# Patient Record
Sex: MALE | Race: BLACK OR AFRICAN AMERICAN | NOT HISPANIC OR LATINO | Employment: STUDENT | URBAN - METROPOLITAN AREA
[De-identification: names, ages, dates, MRNs, and addresses within clinical notes are randomized per-mention and may not be internally consistent; named-entity substitution may affect disease eponyms.]

---

## 2018-04-06 ENCOUNTER — HOSPITAL ENCOUNTER (EMERGENCY)
Facility: OTHER | Age: 33
Discharge: HOME | End: 2018-04-06
Attending: EMERGENCY MEDICINE
Payer: COMMERCIAL

## 2018-04-06 VITALS
DIASTOLIC BLOOD PRESSURE: 79 MMHG | SYSTOLIC BLOOD PRESSURE: 129 MMHG | HEIGHT: 65 IN | BODY MASS INDEX: 27.49 KG/M2 | WEIGHT: 165 LBS | RESPIRATION RATE: 16 BRPM | OXYGEN SATURATION: 97 % | HEART RATE: 81 BPM | TEMPERATURE: 98 F

## 2018-04-06 DIAGNOSIS — R51.9 NONINTRACTABLE HEADACHE, UNSPECIFIED CHRONICITY PATTERN, UNSPECIFIED HEADACHE TYPE: Primary | ICD-10-CM

## 2018-04-06 PROCEDURE — 96372 THER/PROPH/DIAG INJ SC/IM: CPT

## 2018-04-06 PROCEDURE — 63600175 PHARM REV CODE 636 W HCPCS: Performed by: EMERGENCY MEDICINE

## 2018-04-06 PROCEDURE — 99283 EMERGENCY DEPT VISIT LOW MDM: CPT | Mod: 25

## 2018-04-06 RX ORDER — METOCLOPRAMIDE HYDROCHLORIDE 5 MG/ML
10 INJECTION INTRAMUSCULAR; INTRAVENOUS
Status: COMPLETED | OUTPATIENT
Start: 2018-04-06 | End: 2018-04-06

## 2018-04-06 RX ORDER — KETOROLAC TROMETHAMINE 30 MG/ML
30 INJECTION, SOLUTION INTRAMUSCULAR; INTRAVENOUS
Status: COMPLETED | OUTPATIENT
Start: 2018-04-06 | End: 2018-04-06

## 2018-04-06 RX ORDER — IBUPROFEN 600 MG/1
600 TABLET ORAL EVERY 6 HOURS PRN
Qty: 20 TABLET | Refills: 0 | OUTPATIENT
Start: 2018-04-06 | End: 2018-11-05

## 2018-04-06 RX ORDER — METOCLOPRAMIDE 10 MG/1
10 TABLET ORAL EVERY 6 HOURS
Qty: 15 TABLET | Refills: 0 | Status: SHIPPED | OUTPATIENT
Start: 2018-04-06 | End: 2019-03-21

## 2018-04-06 RX ADMIN — KETOROLAC TROMETHAMINE 30 MG: 30 INJECTION, SOLUTION INTRAMUSCULAR at 09:04

## 2018-04-06 RX ADMIN — METOCLOPRAMIDE 10 MG: 5 INJECTION, SOLUTION INTRAMUSCULAR; INTRAVENOUS at 09:04

## 2018-04-07 NOTE — ED PROVIDER NOTES
Encounter Date: 4/6/2018       History     Chief Complaint   Patient presents with    Headache     x 3 days, also c/o sinus problems, no relief with OTC meds     A 33-year-old male with a gradual onset of a 10 over 10 headache that is stabbing on the right side of his head.  Patient states it feels like his typical migraines.  Feels his ALLERGIES and sinus have brought this headache on.  Tried taking over-the-counter medication at home with no improvement.  Denies any associated fevers or chills.          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 toes     Family History   Problem Relation Age of Onset    Stroke Maternal Grandmother     Heart disease Maternal Grandmother     Heart disease Mother     Stroke Mother     Stroke Maternal Grandfather     Heart disease Maternal Grandfather     Stroke Paternal Grandmother     Heart disease Paternal Grandmother     Stroke Paternal Grandfather     Heart disease Paternal Grandfather      Social History   Substance Use Topics    Smoking status: Never Smoker    Smokeless tobacco: Not on file    Alcohol use No     Review of Systems   Constitutional: Negative for activity change, appetite change, chills and fever.   HENT: Positive for congestion. Negative for sinus pressure.    Eyes: Negative for discharge.   Respiratory: Negative for cough, choking, chest tightness and shortness of breath.    Cardiovascular: Negative for chest pain.   Gastrointestinal: Negative for abdominal pain, diarrhea and vomiting.   Genitourinary: Negative for difficulty urinating and dysuria.   Musculoskeletal: Negative for arthralgias.   Skin: Negative for color change.   Neurological: Positive for headaches. Negative for dizziness.   Hematological: Does not bruise/bleed  easily.       Physical Exam     Initial Vitals [04/06/18 2054]   BP Pulse Resp Temp SpO2   129/79 81 16 98.1 °F (36.7 °C) 97 %      MAP       95.67         Physical Exam    Nursing note and vitals reviewed.  Constitutional: He appears well-developed and well-nourished.  Non-toxic appearance. He does not have a sickly appearance. No distress.   HENT:   Head: Normocephalic and atraumatic.   Mouth/Throat: Oropharynx is clear and moist.   Eyes: Conjunctivae, EOM and lids are normal. Pupils are equal, round, and reactive to light. Right eye exhibits no nystagmus. Left eye exhibits no nystagmus.   Neck: Trachea normal, normal range of motion and phonation normal. Neck supple.   Cardiovascular: Normal rate, regular rhythm and normal heart sounds. Exam reveals no gallop and no friction rub.    No murmur heard.  Pulmonary/Chest: Breath sounds normal. No respiratory distress. He has no wheezes. He has no rhonchi. He has no rales.   Abdominal: Soft. Normal appearance and bowel sounds are normal. There is no tenderness. There is no rigidity, no rebound, no guarding, no tenderness at McBurney's point and negative Stokes's sign.   Musculoskeletal: Normal range of motion.   Neurological: He is alert and oriented to person, place, and time. He has normal strength and normal reflexes. He displays normal reflexes. No cranial nerve deficit or sensory deficit. He displays a negative Romberg sign.   Skin: Skin is warm, dry and intact. Capillary refill takes less than 2 seconds. No rash noted. No pallor.         ED Course   Procedures  Labs Reviewed - No data to display          Medical Decision Making:   ED Management:  I believe the patient has an unspecified headache based upon the history and physical exam.  Likely musculoskeletal.  The patient has no focal weakness, numbness, meningismus, other focal neurologic deficit, history of trauma, fevers, elevated blood pressure to suggest meningitis, subarachnoid hemorrhage, TIA, stroke,  mass, or hypertensive urgency. I do not feel a CT of the brain or blood work are necessary at this time. I will treat the patient with over-the-counter medications and have them follow up with their regular doctor.                         Clinical Impression:     1. Nonintractable headache, unspecified chronicity pattern, unspecified headache type                                Maik Edgar, DO  04/06/18 [id number]

## 2018-04-07 NOTE — ED TRIAGE NOTES
Pt presents to ED with c/o generalized headache x 3 days, reports no relief with OTC medications. Pt reports Hx of migraines, denies any other associated symptoms. Pt AAO x4.

## 2018-11-05 ENCOUNTER — HOSPITAL ENCOUNTER (EMERGENCY)
Facility: HOSPITAL | Age: 33
Discharge: HOME | End: 2018-11-05
Attending: EMERGENCY MEDICINE
Payer: COMMERCIAL

## 2018-11-05 VITALS
BODY MASS INDEX: 27.49 KG/M2 | TEMPERATURE: 99 F | HEART RATE: 81 BPM | WEIGHT: 165 LBS | OXYGEN SATURATION: 98 % | SYSTOLIC BLOOD PRESSURE: 122 MMHG | RESPIRATION RATE: 20 BRPM | HEIGHT: 65 IN | DIASTOLIC BLOOD PRESSURE: 76 MMHG

## 2018-11-05 DIAGNOSIS — R07.9 CHEST PAIN WITH LOW RISK OF ACUTE CORONARY SYNDROME: Primary | ICD-10-CM

## 2018-11-05 DIAGNOSIS — R07.89 CHEST DISCOMFORT: ICD-10-CM

## 2018-11-05 LAB — TROPONIN I SERPL DL<=0.01 NG/ML-MCNC: <0.006 NG/ML

## 2018-11-05 PROCEDURE — 84484 ASSAY OF TROPONIN QUANT: CPT

## 2018-11-05 PROCEDURE — 93010 ELECTROCARDIOGRAM REPORT: CPT | Mod: ,,, | Performed by: INTERNAL MEDICINE

## 2018-11-05 PROCEDURE — 99284 EMERGENCY DEPT VISIT MOD MDM: CPT | Mod: 25

## 2018-11-05 PROCEDURE — 93005 ELECTROCARDIOGRAM TRACING: CPT

## 2018-11-05 RX ORDER — HYDROCODONE BITARTRATE AND ACETAMINOPHEN 5; 325 MG/1; MG/1
1 TABLET ORAL
Status: DISCONTINUED | OUTPATIENT
Start: 2018-11-05 | End: 2018-11-05

## 2018-11-05 RX ORDER — IBUPROFEN 600 MG/1
600 TABLET ORAL EVERY 6 HOURS PRN
Qty: 20 TABLET | Refills: 0 | Status: SHIPPED | OUTPATIENT
Start: 2018-11-05 | End: 2019-03-21

## 2018-11-05 NOTE — DISCHARGE INSTRUCTIONS
Please return immediately if You get worse or if new problems develop.  Please follow-up with your primary care doctor this week.  Rest.  Ibuprofen for pain.

## 2018-11-05 NOTE — ED PROVIDER NOTES
Encounter Date: 11/5/2018    SORT: 3 week hx midsternal cp, radiates to mid back. Family hx premature heart disease. Denies personal cardiac hx. No DM. No HTN or HLD diagnosis. Nonsmoker.        History     Chief Complaint   Patient presents with    Chest Pain     pt here for chest pain that is constant x3 weeks. located midsternal; radiates to back. denies n/v/d.      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     Family History   Problem Relation Age of Onset    Stroke Maternal Grandmother     Heart disease Maternal Grandmother     Heart disease Mother     Stroke Mother     Stroke Maternal Grandfather     Heart disease Maternal Grandfather     Stroke Paternal Grandmother     Heart disease Paternal Grandmother     Stroke Paternal Grandfather     Heart disease Paternal Grandfather      Social History     Tobacco Use    Smoking status: Never Smoker   Substance Use Topics    Alcohol use: No    Drug use: No     Review of Systems    Physical Exam     Initial Vitals [11/05/18 1425]   BP Pulse Resp Temp SpO2   127/80 96 18 98.4 °F (36.9 °C) 97 %      MAP       --         Physical Exam  The patient was examined specifically for the following:   General:No significant distress, Good color, Warm and dry. Head and neck:Scalp atraumatic, Neck supple. Neurological:Appropriate conversation, Gross motor deficits. Eyes:Conjugate gaze, Clear corneas. ENT: No epistaxis. Cardiac: Regular rate and rhythm, Grossly normal heart tones. Pulmonary: Wheezing, Rales. Gastrointestinal: Abdominal tenderness, Abdominal distention. Musculoskeletal: Extremity deformity, Apparent pain with range of motion of the joints. Skin: Rash.   The findings on examination were normal .  The lungs are clear.  The heart  tones are normal.  The abdomen is soft.  There is mild tenderness in the left upper sternal border with palpation.  ED Course   Procedures  Labs Reviewed - No data to display       Imaging Results    None                               Clinical Impression:   The encounter diagnosis was Chest discomfort.                             Chuy Ray MD  11/05/18 8993

## 2018-11-05 NOTE — ED PROVIDER NOTES
Encounter Date: 11/5/2018    SCRIBE #1 NOTE: I, Darcy Cain, am scribing for, and in the presence of,  Chuy Ray MD. I have scribed the following portions of the note - Other sections scribed: HPI, ROS.       History     Chief Complaint   Patient presents with    Chest Pain     pt here for chest pain that is constant x3 weeks. located midsternal; radiates to back. denies n/v/d.      CC: Chest Pain    HPI: This is a 33 y.o. M who has Scoliosis, Lumbago, Arthritis, and Hx of Migraine Headache who presents to the ED for emergent evaluation of acute and intermittent mid-sternal CP that radiates to the left side of chest and left shoulder x's 3 weeks. Episodes of chest pain last 2 days at a time. Pt's CP is exacerbated by taking deep breaths and with coughing. He reports a 1 week history of cough. Pt also reports a mild right temporal headache. Pt denies fever, chills, ear pain, sore throat, eye pain, cough, SOB, CP, leg swelling, abdominal pain, nausea, vomiting, diarrhea, dysuria, back pain, leg problems, rash, Hx of DVT, Hx of PE, Hx of cardiac disease, tobacco use, or alcohol use.      The history is provided by the patient. No  was used.     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   HENT: Negative for ear pain and sore throat.    Eyes: Negative for pain.   Respiratory: Positive for cough. Negative for shortness of breath.    Cardiovascular: Positive for chest pain. Negative for leg swelling.   Gastrointestinal: Negative for abdominal pain, diarrhea, nausea and vomiting.   Genitourinary: Negative for dysuria.   Musculoskeletal: Positive for arthralgias (left shoulder). Negative for back pain.        (-) arm or leg problems   Skin: Negative for rash.   Neurological: Positive for headaches.       Physical Exam     Initial Vitals [11/05/18 1425]   BP Pulse Resp Temp SpO2   127/80 96 18 98.4 °F (36.9 °C) 97 %      MAP       --         Physical Exam  The patient was examined specifically for the following:   General:No significant distress, Good color, Warm and dry. Head and neck:Scalp atraumatic, Neck supple. Neurological:Appropriate conversation, Gross motor deficits. Eyes:Conjugate gaze, Clear corneas. ENT: No epistaxis. Cardiac: Regular rate and rhythm, Grossly normal heart tones. Pulmonary: Wheezing, Rales. Gastrointestinal: Abdominal tenderness, Abdominal distention. Musculoskeletal: Extremity deformity, Apparent pain with range of motion of the joints. Skin: Rash.   The findings on examination were normal except for the following:  The patient has tenderness of the left upper sternal border.  Lungs are clear.  The heart tones are normal.  The abdomen is soft.  ED Course   Procedures  Labs Reviewed   TROPONIN I     EKG Readings: (Independently Interpreted)   This patient is in a normal sinus rhythm with a heart rate of 86.  The p.r. QRS and QT intervals are normal.  There is no definite evidence of acute myocardial infarction or malignant arrhythmia.  This patient has a nonspecific intraventricular conduction delay.       Imaging Results          X-Ray Chest PA And Lateral (Final result)  Result  time 11/05/18 14:52:26    Final result by Lázaro Roth MD (11/05/18 14:52:26)                 Impression:      No acute cardiopulmonary disease      Electronically signed by: Lázaro Roth MD  Date:    11/05/2018  Time:    14:52             Narrative:    EXAMINATION:  XR CHEST PA AND LATERAL    CLINICAL HISTORY:  Other chest pain    TECHNIQUE:  PA and lateral views of the chest were performed.    COMPARISON:  05/12/2015    FINDINGS:  The heart size and pulmonary vessels are normal.  Lungs are expanded and clear.  No lung consolidation or pleural fluid is identified.  Skeletal structures show no acute finding, noting a moderate levoscoliosis around the thoracolumbar junction.                              Medical decision making:  This patient presents to the emergency room with a 2 week history chest pain that is worse with deep breathing.  The troponin is negative chest x-ray is negative the EKG is essentially unremarkable except for a nonspecific intraventricular conduction delay.  The patient has a low heart score.  The patient is not tachycardic or short of breath. I doubt pulmonary embolus.  Chest x-ray is negative for pneumothorax pneumonia pleural effusion.  I will discharge this patient to be evaluated by Cardiology as an outpatient.  He is low risk for coronary artery disease.                Scribe Attestation:   Scribe #1: I performed the above scribed service and the documentation accurately describes the services I performed. I attest to the accuracy of the note.    Attending Attestation:           Physician Attestation for Scribe:  Physician Attestation Statement for Scribe #1: I, Chuy Ray MD, reviewed documentation, as scribed by Darcy Cain in my presence, and it is both accurate and complete.                    Clinical Impression:   The primary encounter diagnosis was Chest pain with low risk of acute coronary syndrome. A diagnosis of Chest discomfort was also pertinent to this  visit.                             Chuy Ray MD  11/07/18 0889

## 2018-11-05 NOTE — ED TRIAGE NOTES
Patient stated that he's having chest pain and migraines X 3 weeks. Pain 10/10 chest and head. H/A constant and pounding in nature. Chest pain is sharp and radiates to back. No medications taken today for pain.

## 2019-03-21 ENCOUNTER — HOSPITAL ENCOUNTER (EMERGENCY)
Facility: HOSPITAL | Age: 34
Discharge: HOME | End: 2019-03-21
Attending: EMERGENCY MEDICINE
Payer: COMMERCIAL

## 2019-03-21 VITALS
WEIGHT: 176 LBS | BODY MASS INDEX: 29.29 KG/M2 | TEMPERATURE: 99 F | HEART RATE: 101 BPM | DIASTOLIC BLOOD PRESSURE: 81 MMHG | RESPIRATION RATE: 18 BRPM | OXYGEN SATURATION: 95 % | SYSTOLIC BLOOD PRESSURE: 118 MMHG

## 2019-03-21 DIAGNOSIS — Z71.1 CONCERN ABOUT INFECTIOUS DISEASE WITHOUT DIAGNOSIS: Primary | ICD-10-CM

## 2019-03-21 LAB
BASOPHILS # BLD AUTO: 0.03 K/UL
BASOPHILS NFR BLD: 0.7 %
DIFFERENTIAL METHOD: ABNORMAL
EOSINOPHIL # BLD AUTO: 0.4 K/UL
EOSINOPHIL NFR BLD: 8.8 %
ERYTHROCYTE [DISTWIDTH] IN BLOOD BY AUTOMATED COUNT: 13.6 %
HCT VFR BLD AUTO: 44.3 %
HGB BLD-MCNC: 15.3 G/DL
HIV1+2 IGG SERPL QL IA.RAPID: NEGATIVE
LYMPHOCYTES # BLD AUTO: 1.2 K/UL
LYMPHOCYTES NFR BLD: 27.9 %
MCH RBC QN AUTO: 29.4 PG
MCHC RBC AUTO-ENTMCNC: 34.5 G/DL
MCV RBC AUTO: 85 FL
MONOCYTES # BLD AUTO: 0.4 K/UL
MONOCYTES NFR BLD: 8.8 %
NEUTROPHILS # BLD AUTO: 2.3 K/UL
NEUTROPHILS NFR BLD: 53.8 %
PLATELET # BLD AUTO: 215 K/UL
PMV BLD AUTO: 9.7 FL
RBC # BLD AUTO: 5.21 M/UL
WBC # BLD AUTO: 4.3 K/UL

## 2019-03-21 PROCEDURE — 85025 COMPLETE CBC W/AUTO DIFF WBC: CPT

## 2019-03-21 PROCEDURE — 99283 EMERGENCY DEPT VISIT LOW MDM: CPT

## 2019-03-21 PROCEDURE — 86703 HIV-1/HIV-2 1 RESULT ANTBDY: CPT

## 2019-03-21 NOTE — ED TRIAGE NOTES
Patient presented to the ED stating that he went to a plasma center where he regularly goes to give plasma and they told him he could not donate because he had a pos HIV test. Patient is here to confirm whether or not it is true.

## 2019-03-21 NOTE — ED PROVIDER NOTES
Encounter Date: 3/21/2019    SCRIBE #1 NOTE: I, Lupe Bermudez, am scribing for, and in the presence of,  SHANICE Garcia. I have scribed the following portions of the note - Other sections scribed: HPI and ROS.       History     Chief Complaint   Patient presents with    Abnormal Lab     went to [facility] and states was tested positive for HIV; here to get tested for HIV; denies any symptoms     CC: Abnormal Lab    HPI: This 34 y.o male presents to the ED for an evaluation for an abnormal lab.  Patient reports attempting to donate at the [facility] and states he was informed he tested positive for HIV.  He states he was advised to follow up.  He reports coming to the ED for a second opinion.  Patient denies fever, chills, nausea, emesis, diarrhea, abdominal pain, chest pain, back pain, cough, rhinorrhea, rash, or any other associated symptoms.  No prior tx.  No alleviating factors.      The history is provided by the patient. No  was used.     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   HENT: Negative for congestion, ear pain, rhinorrhea and sore throat.    Eyes: Negative for pain and  visual disturbance.   Respiratory: Negative for cough and shortness of breath.    Cardiovascular: Negative for chest pain.   Gastrointestinal: Negative for abdominal pain, diarrhea, nausea and vomiting.   Genitourinary: Negative for dysuria.   Musculoskeletal: Negative for back pain and neck pain.   Skin: Negative for rash.   Neurological: Negative for headaches.   All other systems reviewed and are negative.      Physical Exam     Initial Vitals [03/21/19 1803]   BP Pulse Resp Temp SpO2   136/88 (!) 117 20 99.1 °F (37.3 °C) 96 %      MAP       --         Physical Exam    Nursing note and vitals reviewed.  Constitutional: He appears well-developed and well-nourished. He is not diaphoretic. He is active.  Non-toxic appearance. He does not have a sickly appearance. He does not appear ill. No distress.   HENT:   Head: Normocephalic and atraumatic.   Right Ear: External ear normal.   Left Ear: External ear normal.   Nose: Nose normal.   Eyes: EOM are normal. Right eye exhibits no discharge. Left eye exhibits no discharge.   Neck: Normal range of motion. Neck supple. No tracheal deviation present.   Pulmonary/Chest: No stridor. No respiratory distress.   Abdominal: Soft. He exhibits no distension. There is no tenderness.   Musculoskeletal: Normal range of motion. He exhibits no tenderness.   Neurological: He is alert and oriented to person, place, and time. He has normal strength. No cranial nerve deficit.   Skin: Skin is warm and dry.   Psychiatric: He has a normal mood and affect. His behavior is normal. Judgment and thought content normal.         ED Course   Procedures  Labs Reviewed   CBC W/ AUTO DIFFERENTIAL - Abnormal; Notable for the following components:       Result Value    Eosinophil% 8.8 (*)     All other components within normal limits   RAPID HIV          Imaging Results    None                     Scribe Attestation:   Scribe #1: I performed the above scribed service and the documentation accurately  describes the services I performed. I attest to the accuracy of the note.    Attending Attestation:     Physician Attestation Statement for NP/PA:   I discussed this assessment and plan of this patient with the NP/PA, but I did not personally examine the patient. The face to face encounter was performed by the NP/PA.        Physician Attestation for Scribe:  Physician Attestation Statement for Scribe #1: I, Art Russell, MARIBEL-C, reviewed documentation, as scribed by Lupe Bermudez in my presence, and it is both accurate and complete.                    Clinical Impression:       ICD-10-CM ICD-9-CM   1. Concern about infectious disease without diagnosis Z71.1 V65.5                                Dave Mobley MD  03/23/19 1703

## 2019-03-22 NOTE — DISCHARGE INSTRUCTIONS
Follow-up with [facility] for further testing and treatment as discussed.  Return to the emergency department for any new or worsening symptoms or as needed for any additional concerns.    Thank you for coming to our Emergency Department today. It is important to remember that some problems are difficult to diagnose and may not be found during your first visit. Be sure to follow up with your primary care doctor.  If you do not have one, you may contact the one listed on your discharge paperwork or you may also call the [facility] Appointment Desk at [phone] to schedule an appointment with one.     Return to the ER with any questions/concerns, new/concerning symptoms, worsening or failure to improve. Do not drive or make any important decisions for 24 hours if you have received any pain medications, sedatives or mood altering drugs during your ER visit.